# Patient Record
Sex: MALE | Race: WHITE | NOT HISPANIC OR LATINO | Employment: FULL TIME | URBAN - NONMETROPOLITAN AREA
[De-identification: names, ages, dates, MRNs, and addresses within clinical notes are randomized per-mention and may not be internally consistent; named-entity substitution may affect disease eponyms.]

---

## 2020-11-19 PROCEDURE — U0004 COV-19 TEST NON-CDC HGH THRU: HCPCS | Performed by: NURSE PRACTITIONER

## 2021-02-07 ENCOUNTER — APPOINTMENT (OUTPATIENT)
Dept: CT IMAGING | Facility: HOSPITAL | Age: 21
End: 2021-02-07

## 2021-02-07 ENCOUNTER — HOSPITAL ENCOUNTER (EMERGENCY)
Facility: HOSPITAL | Age: 21
Discharge: HOME | End: 2021-02-07
Attending: EMERGENCY MEDICINE | Admitting: EMERGENCY MEDICINE

## 2021-02-07 VITALS
BODY MASS INDEX: 26.41 KG/M2 | SYSTOLIC BLOOD PRESSURE: 110 MMHG | HEART RATE: 77 BPM | RESPIRATION RATE: 18 BRPM | OXYGEN SATURATION: 98 % | DIASTOLIC BLOOD PRESSURE: 67 MMHG | TEMPERATURE: 97.8 F | WEIGHT: 195 LBS | HEIGHT: 72 IN

## 2021-02-07 DIAGNOSIS — S03.03XA DISLOCATION OF JAW, BILATERAL, INITIAL ENCOUNTER: Primary | ICD-10-CM

## 2021-02-07 PROCEDURE — 70486 CT MAXILLOFACIAL W/O DYE: CPT

## 2021-02-07 PROCEDURE — 96374 THER/PROPH/DIAG INJ IV PUSH: CPT

## 2021-02-07 PROCEDURE — 25010000002 PROPOFOL 10 MG/ML EMULSION: Performed by: EMERGENCY MEDICINE

## 2021-02-07 PROCEDURE — 99152 MOD SED SAME PHYS/QHP 5/>YRS: CPT

## 2021-02-07 PROCEDURE — 25010000002 ONDANSETRON PER 1 MG: Performed by: EMERGENCY MEDICINE

## 2021-02-07 PROCEDURE — 99284 EMERGENCY DEPT VISIT MOD MDM: CPT

## 2021-02-07 RX ORDER — ONDANSETRON 2 MG/ML
4 INJECTION INTRAMUSCULAR; INTRAVENOUS ONCE
Status: DISCONTINUED | OUTPATIENT
Start: 2021-02-07 | End: 2021-02-07

## 2021-02-07 RX ORDER — PROPOFOL 10 MG/ML
100 VIAL (ML) INTRAVENOUS ONCE
Status: COMPLETED | OUTPATIENT
Start: 2021-02-07 | End: 2021-02-07

## 2021-02-07 RX ORDER — SODIUM CHLORIDE 0.9 % (FLUSH) 0.9 %
10 SYRINGE (ML) INJECTION AS NEEDED
Status: DISCONTINUED | OUTPATIENT
Start: 2021-02-07 | End: 2021-02-07 | Stop reason: HOSPADM

## 2021-02-07 RX ORDER — ONDANSETRON 2 MG/ML
4 INJECTION INTRAMUSCULAR; INTRAVENOUS ONCE
Status: COMPLETED | OUTPATIENT
Start: 2021-02-07 | End: 2021-02-07

## 2021-02-07 RX ADMIN — ONDANSETRON 4 MG: 2 INJECTION INTRAMUSCULAR; INTRAVENOUS at 02:57

## 2021-02-07 RX ADMIN — PROPOFOL 100 MG: 10 INJECTION, EMULSION INTRAVENOUS at 03:50

## 2021-02-07 NOTE — ED PROVIDER NOTES
"Subjective   20-year-old male presents to the ED with a chief complaint of jaw pain.  Patient states that he was out drinking with some friends and he went back to his dorm and accidentally vomited.  When he vomited he thinks that he injured his jaw because since he vomited he has been unable to close his jaw.  He states that his jaws remained open no matter how much he tries to close it and it feels \" funny\".  He denies other injury or complaint at this time.          Review of Systems   HENT:        Jaw problem   All other systems reviewed and are negative.      History reviewed. No pertinent past medical history.    No Known Allergies    History reviewed. No pertinent surgical history.    History reviewed. No pertinent family history.    Social History     Socioeconomic History   • Marital status:      Spouse name: Not on file   • Number of children: Not on file   • Years of education: Not on file   • Highest education level: Not on file   Tobacco Use   • Smoking status: Never Smoker   Substance and Sexual Activity   • Alcohol use: Yes           Objective   Physical Exam  Vitals signs and nursing note reviewed.   Constitutional:       General: He is not in acute distress.     Appearance: He is well-developed. He is not diaphoretic.   HENT:      Head: Normocephalic and atraumatic.      Comments: Minimal tenderness to palpation of the bilateral pleural mandibular joints.  Inability to close his mouth.  Tolerating his oral secretions.     Nose: Nose normal.   Eyes:      Conjunctiva/sclera: Conjunctivae normal.      Pupils: Pupils are equal, round, and reactive to light.   Cardiovascular:      Rate and Rhythm: Normal rate and regular rhythm.   Pulmonary:      Effort: Pulmonary effort is normal. No respiratory distress.      Breath sounds: Normal breath sounds.   Abdominal:      General: There is no distension.      Palpations: Abdomen is soft.      Tenderness: There is no abdominal tenderness. "   Musculoskeletal:         General: No deformity.   Neurological:      Mental Status: He is alert and oriented to person, place, and time.      Cranial Nerves: No cranial nerve deficit.      Coordination: Coordination normal.         Procedural Sedation    Date/Time: 2021 5:17 AM  Performed by: Asa Alvarez DO  Authorized by: Asa Alvarez DO     Consent:     Consent obtained:  Verbal    Consent given by:  Patient    Risks discussed:  Allergic reaction and dysrhythmia    Alternatives discussed:  Analgesia without sedation  Indications:     Procedure performed:  Dislocation reduction    Procedure necessitating sedation performed by:  Physician performing sedation    Intended level of sedation:  Moderate (conscious sedation)  Pre-sedation assessment:     NPO status caution: unable to specify NPO status      Neck mobility: normal      Mouth openin finger widths    Mallampati score:  II - soft palate, uvula, fauces visible    Pre-sedation assessments completed and reviewed: airway patency, cardiovascular function, hydration status, mental status, nausea/vomiting, pain level, respiratory function and temperature      History of difficult intubation: no    Immediate pre-procedure details:     Reassessment: Patient reassessed immediately prior to procedure      Reviewed: vital signs    Procedure details (see MAR for exact dosages):     Preoxygenation:  Nasal cannula    Sedation:  Propofol    Intra-procedure monitoring:  Blood pressure monitoring, cardiac monitor, continuous pulse oximetry, frequent LOC assessments and frequent vital sign checks    Intra-procedure events: none      Total sedation time (minutes):  10  Post-procedure details:     Attendance: Constant attendance by certified staff until patient recovered      Recovery: Patient returned to pre-procedure baseline      Estimated blood loss (see I/O flowsheets): no      Post-sedation assessments completed and reviewed: airway patency,  cardiovascular function, hydration status, mental status, nausea/vomiting, pain level, respiratory function and temperature      Specimens recovered:  None    Patient is stable for discharge or admission: yes      Patient tolerance:  Tolerated well, no immediate complications  Jaw Dislocation    Date/Time: 2/7/2021 5:19 AM  Performed by: Asa Alvarez DO  Authorized by: Asa Alvarez DO     Consent:     Consent obtained:  Verbal    Consent given by:  Patient    Risks discussed:  Fracture, irreducible dislocation and recurrent dislocation    Alternatives discussed:  No treatment  Pre-procedure details:     Injury location:  Bilateral TMJ    Chronicity:  New    Range of motion: reduced    Sedation:     Sedation type:  Moderate (conscious) sedation  Procedure details:     Manipulation performed: yes      Jaw reduction method:  Downward posterior pressure    Reduction successful: yes      X-ray confirmed reduction: no    Post-procedure details:     Range of motion: normal      Patient tolerance of procedure:  Tolerated well, no immediate complications               ED Course                                           MDM  20-year-old male presents to the ED status post vomiting with suspected bilateral temporomandibular joint dislocation.  CT confirms bilateral jaw dislocation.  Initially attempted to reduce without sedation using the intraoral technique.  Also tried the syringe technique but the patient could not close his jaw enough to roll the syringes.  Conscious sedation was performed and the jaw was reduced without any difficulty.  Patient tolerated the procedure well.  He is appropriate for discharge follow-up as needed.  Final diagnoses:   Dislocation of jaw, bilateral, initial encounter            Asa Alvarez DO  02/07/21 0526

## 2021-02-07 NOTE — ED NOTES
At this time, pt vomiting. Pt states its because he has been drinking.      Amrita Stubbs, RN  02/07/21 0241

## 2021-02-07 NOTE — ED NOTES
Moderate sedation consent signed at this time. DO Antonio at bedside to explain procedure.      Amrita Stubbs, RN  02/07/21 7906

## 2021-03-19 ENCOUNTER — IMMUNIZATION (OUTPATIENT)
Dept: VACCINE CLINIC | Facility: HOSPITAL | Age: 21
End: 2021-03-19

## 2021-03-19 PROCEDURE — 0001A: CPT | Performed by: INTERNAL MEDICINE

## 2021-03-19 PROCEDURE — 91300 HC SARSCOV02 VAC 30MCG/0.3ML IM: CPT | Performed by: INTERNAL MEDICINE

## 2021-04-09 ENCOUNTER — IMMUNIZATION (OUTPATIENT)
Dept: VACCINE CLINIC | Facility: HOSPITAL | Age: 21
End: 2021-04-09

## 2021-04-09 PROCEDURE — 91300 HC SARSCOV02 VAC 30MCG/0.3ML IM: CPT | Performed by: INTERNAL MEDICINE

## 2021-04-09 PROCEDURE — 0002A: CPT | Performed by: INTERNAL MEDICINE
